# Patient Record
Sex: FEMALE | Race: WHITE | NOT HISPANIC OR LATINO | Employment: FULL TIME | URBAN - METROPOLITAN AREA
[De-identification: names, ages, dates, MRNs, and addresses within clinical notes are randomized per-mention and may not be internally consistent; named-entity substitution may affect disease eponyms.]

---

## 2017-06-18 ENCOUNTER — HOSPITAL ENCOUNTER (EMERGENCY)
Facility: HOSPITAL | Age: 32
Discharge: HOME | End: 2017-06-18
Attending: EMERGENCY MEDICINE | Admitting: EMERGENCY MEDICINE

## 2017-06-18 VITALS
HEART RATE: 93 BPM | HEIGHT: 64 IN | WEIGHT: 168 LBS | TEMPERATURE: 97.9 F | OXYGEN SATURATION: 96 % | DIASTOLIC BLOOD PRESSURE: 76 MMHG | SYSTOLIC BLOOD PRESSURE: 149 MMHG | RESPIRATION RATE: 20 BRPM | BODY MASS INDEX: 28.68 KG/M2

## 2017-06-18 DIAGNOSIS — S61.012A THUMB LACERATION, LEFT, INITIAL ENCOUNTER: Primary | ICD-10-CM

## 2017-06-18 PROCEDURE — 90715 TDAP VACCINE 7 YRS/> IM: CPT | Performed by: NURSE PRACTITIONER

## 2017-06-18 PROCEDURE — 90471 IMMUNIZATION ADMIN: CPT | Performed by: NURSE PRACTITIONER

## 2017-06-18 PROCEDURE — 25010000002 TDAP 5-2.5-18.5 LF-MCG/0.5 SUSPENSION: Performed by: NURSE PRACTITIONER

## 2017-06-18 PROCEDURE — 99283 EMERGENCY DEPT VISIT LOW MDM: CPT

## 2017-06-18 RX ADMIN — TETANUS TOXOID, REDUCED DIPHTHERIA TOXOID AND ACELLULAR PERTUSSIS VACCINE, ADSORBED 0.5 ML: 5; 2.5; 8; 8; 2.5 SUSPENSION INTRAMUSCULAR at 18:10

## 2021-03-20 ENCOUNTER — APPOINTMENT (OUTPATIENT)
Dept: GENERAL RADIOLOGY | Age: 36
End: 2021-03-20
Payer: COMMERCIAL

## 2021-03-20 ENCOUNTER — HOSPITAL ENCOUNTER (EMERGENCY)
Age: 36
Discharge: HOME | End: 2021-03-21
Payer: COMMERCIAL

## 2021-03-20 VITALS
WEIGHT: 148.9 LBS | BODY MASS INDEX: 25.42 KG/M2 | HEIGHT: 64 IN | OXYGEN SATURATION: 99 % | HEART RATE: 90 BPM | RESPIRATION RATE: 18 BRPM | SYSTOLIC BLOOD PRESSURE: 127 MMHG | DIASTOLIC BLOOD PRESSURE: 75 MMHG | TEMPERATURE: 98.2 F
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
## 2021-03-20 DIAGNOSIS — M25.561 ACUTE PAIN OF RIGHT KNEE: Primary | ICD-10-CM

## 2021-03-20 PROCEDURE — 73562 X-RAY EXAM OF KNEE 3: CPT

## 2021-03-20 PROCEDURE — 99282 EMERGENCY DEPT VISIT SF MDM: CPT

## 2021-03-20 RX ORDER — IBUPROFEN 800 MG/1
800 TABLET ORAL EVERY 8 HOURS PRN
Qty: 20 TABLET | Refills: 0 | Status: SHIPPED | OUTPATIENT
Start: 2021-03-20

## 2021-03-20 RX ORDER — LIDOCAINE 4 G/G
1 PATCH TOPICAL DAILY
Qty: 30 PATCH | Refills: 0 | Status: SHIPPED | OUTPATIENT
Start: 2021-03-20 | End: 2021-04-19

## 2021-03-20 RX ORDER — M-VIT,TX,IRON,MINS/CALC/FOLIC 27MG-0.4MG
1 TABLET ORAL DAILY
COMMUNITY

## 2021-03-20 ASSESSMENT — PAIN DESCRIPTION - PAIN TYPE: TYPE: ACUTE PAIN

## 2021-03-20 ASSESSMENT — PAIN DESCRIPTION - LOCATION: LOCATION: LEG

## 2021-03-20 ASSESSMENT — PAIN SCALES - GENERAL: PAINLEVEL_OUTOF10: 7

## 2021-03-21 ASSESSMENT — ENCOUNTER SYMPTOMS
VOMITING: 0
SHORTNESS OF BREATH: 0
NAUSEA: 0
DIARRHEA: 0
ABDOMINAL PAIN: 0
CHEST TIGHTNESS: 0

## 2021-03-21 NOTE — ED PROVIDER NOTES
[address] [facility]        Pt Name: Yumiko Akbar  MRN: [id number]  [place] 1985  Date of evaluation: 3/20/2021  Provider: VILMA Nails CNP  PCP: No primary care provider on file. AVIS. I have evaluated this patient. My supervising physician was available for consultation. CHIEF COMPLAINT       Chief Complaint   Patient presents with    Knee Pain     pt stepped off ladder, felt a pop, and had immediate pain       HISTORY OF PRESENT ILLNESS   (Location, Timing/Onset, Context/Setting, Quality, Duration, Modifying Factors, Severity, Associated Signs and Symptoms)  Note limiting factors. Yumiko Akbar is a 30 y.o. female presents to the emergency department complaining of right knee injury. The patient reports that the injury occurred several days ago while at work. She was stepping down off of a ladder as the latter was being supported by coworkers. States when she planted to the right foot to the ground that she immediately experienced pain and she felt/heard a popping sensation. She has pain with any weightbearing or purposeful movement, not much pain at rest.  She does rate her pain a 7 of 10, aching. Denies any headache, fever, lightheadedness, dizziness, visual disturbances. No chest pain or pressure. No neck or back pain. No shortness of breath, cough, or congestion. No abdominal pain, nausea, vomiting, diarrhea, constipation, or dysuria. No rash. Nursing Notes were all reviewed and agreed with or any disagreements were addressed in the HPI. REVIEW OF SYSTEMS    (2-9 systems for level 4, 10 or more for level 5)     Review of Systems   Constitutional: Negative for activity change, chills and fever. Respiratory: Negative for chest tightness and shortness of breath. Cardiovascular: Negative for chest pain. Gastrointestinal: Negative for abdominal pain, diarrhea, nausea and vomiting. Genitourinary: Negative for dysuria. Musculoskeletal:        Right knee pain   All other systems reviewed and are negative. Positives and Pertinent negatives as per HPI. Except as noted above in the ROS, all other systems were reviewed and negative. PAST MEDICAL HISTORY     Past Medical History:   Diagnosis Date    Asthma          SURGICAL HISTORY     Past Surgical History:   Procedure Laterality Date    CHOLECYSTECTOMY      WISDOM TOOTH EXTRACTION           CURRENTMEDICATIONS       Discharge Medication List as of 3/20/2021 11:52 PM      CONTINUE these medications which have NOT CHANGED    Details   Multiple Vitamins-Minerals (THERAPEUTIC MULTIVITAMIN-MINERALS) tablet Take 1 tablet by mouth dailyHistorical Med               ALLERGIES     Sulfa antibiotics and Tramadol    FAMILYHISTORY     No family history on file. SOCIAL HISTORY       Social History     Tobacco Use    Smoking status: Not on file   Substance Use Topics    Alcohol use: Not on file    Drug use: Not on file       SCREENINGS             PHYSICAL EXAM    (up to 7 for level 4, 8 or more for level 5)     ED Triage Vitals [03/20/21 2255]   BP Temp Temp Source Pulse Resp SpO2 Height Weight   127/75 98.2 °F (36.8 °C) Oral 90 18 99 % 5' 4\" (1.626 m) 148 lb 14.4 oz (67.5 kg)       Physical Exam  Vitals signs and nursing note reviewed. Constitutional:       Appearance: She is well-developed. She is not diaphoretic. HENT:      Head: Normocephalic and atraumatic. Right Ear: External ear normal.      Left Ear: External ear normal.   Eyes:      General:         Right eye: No discharge. Left eye: No discharge. Neck:      Musculoskeletal: Normal range of motion and neck supple. Vascular: No JVD. Cardiovascular:      Rate and Rhythm: Normal rate and regular rhythm. Pulses: Normal pulses. Heart sounds: Normal heart sounds. Pulmonary:      Effort: Pulmonary effort is normal. No respiratory distress. following medications:  Medications - No data to display        Briefly, this is a 30year old female that presents to the emergency department complaining of right knee injury. The patient reports that the injury occurred several days ago while at work. She was stepping down off of a ladder as the latter was being supported by coworkers. States when she planted to the right foot to the ground that she immediately experienced pain and she felt/heard a popping sensation. She has pain with any weightbearing or purposeful movement, not much pain at rest.  She does rate her pain a 7 of 10, aching. XR KNEE RIGHT (3 VIEWS) (Final result)  Result time 03/20/21 23:33:00  Final result by Madisyn Aleman MD (03/20/21 23:33:00)                Impression:    No acute fracture or malalignment. Patient is placed in a knee immobilizer, she is given NSAIDs. [place] of Work[place]'s [place] paperwork is completed. She is given a referral to orthopedics for [address] [place] [address] follow-up. I estimate there is LOW risk for FRACTURE, COMPARTMENT SYNDROME, DEEP VENOUS THROMBOSIS, SEPTIC ARTHRITIS, TENDON OR NEUROVASCULAR INJURY, thus I consider the discharge disposition reasonable. FINAL IMPRESSION      1. Acute pain of right knee    2.  Work related injury          DISPOSITION/PLAN   DISPOSITION Decision To Discharge 03/20/2021 11:41:10 PM      PATIENT REFERREDTO:  Zander Carranza MD  [address] [place] [address]  [phone]    Schedule an appointment as soon as possible for a visit         DISCHARGE MEDICATIONS:  Discharge Medication List as of 3/20/2021 11:52 PM      START taking these medications    Details   ibuprofen (ADVIL;MOTRIN) 800 MG tablet Take 1 tablet by mouth every 8 hours as needed for Pain or Fever, Disp-20 tablet, R-0Print      lidocaine 4 % external patch Place 1 patch onto the skin daily, Transdermal, DAILY Starting Sat 3/20/2021, Until Mon 4/19/2021, For 30 days, Disp-30 patch, R-0, Print             DISCONTINUED MEDICATIONS:  Discharge Medication List as of 3/20/2021 11:52 PM                 (Please note that portions of this note were completed with a voice recognition program.  Efforts were made to edit the dictations but occasionally words are mis-transcribed.)    VILMA Marie CNP (electronically signed)            VILMA Marie CNP  03/21/21 8218

## 2021-03-25 ENCOUNTER — OFFICE VISIT (OUTPATIENT)
Dept: ORTHOPEDIC SURGERY | Age: 36
End: 2021-03-25
Payer: COMMERCIAL

## 2021-03-25 VITALS
TEMPERATURE: 98.8 F | HEART RATE: 90 BPM | SYSTOLIC BLOOD PRESSURE: 117 MMHG | BODY MASS INDEX: 24.66 KG/M2 | DIASTOLIC BLOOD PRESSURE: 72 MMHG | WEIGHT: 148 LBS | HEIGHT: 65 IN

## 2021-03-25 DIAGNOSIS — S83.281A TEAR OF LATERAL MENISCUS OF RIGHT KNEE, CURRENT, UNSPECIFIED TEAR TYPE, INITIAL ENCOUNTER: Primary | ICD-10-CM

## 2021-03-25 PROCEDURE — 99203 OFFICE O/P NEW LOW 30 MIN: CPT | Performed by: PHYSICIAN ASSISTANT

## 2021-03-25 NOTE — LETTER
[facility]  [place] [address] [address] [address]  Phone: [phone]  Fax: [phone]    Juan John        March 25, 2021     Patient: Olena Saul   YOB: 1985   Date of Visit: 3/25/2021       To Whom It May Concern: It is my medical opinion that Olena Saul should remain out of work until after her mri. This will be estimated 1 week. If you have any questions or concerns, please don't hesitate to call.     Sincerely,          MARKO John

## 2021-03-25 NOTE — PROGRESS NOTES
tablet Take 1 tablet by mouth every 8 hours as needed for Pain or Fever 20 tablet 0    lidocaine 4 % external patch Place 1 patch onto the skin daily 30 patch 0     No current facility-administered medications for this visit. Objective:     She  is alert, oriented x 3, pleasant, well nourished, developed and in no acute distress. /72   Pulse 90   Temp 98.8 °F (37.1 °C)   Ht 5' 5\" (1.651 m)   Wt 148 lb (67.1 kg)   BMI 24.63 kg/m²      Examination of the right knee: Inspection of the skin no rashes or lesions. Inspection of the soft tissues no erythema. The overall alignment of the knee is neutral.  Gait is antalgic. There is a mild intra-articular effusion. AROM:           PROM:  Extension-         0                   0  Flexion -           100                   110  There moderate pain associated with ROM testing. Medial joint line non-tender to palpation. Lateral joint line is tender to palpation. Pes anserine bursa non- tender to palpation. Patellar tendon non- tender to palpation. Quadriceps tendon non- tender to palpation. Collateral ligaments non- tender to palpation. Popliteal fossa non-tender to palpation. Varus Stress testing negative for pain or crepitus. Valgus Stress testing negative for pain or crepitus. Lachman's test negative for  ACL laxity. Anterior Drawer test negative for ACL laxity. Posterior Drawer test negative for PCL laxity. Guerita's Test positive for meniscus tear. Patellar Compression testing negative for pain or crepitus. Extensor Mechanism is intact. Examination of the lower extremities are intact with sensation to light touch. Motor testing  5/5 in all major motor groups of the lower extremities. Gait is normal heel to toe. Gait is antalgic. SLR negative. Examination of the lower extremities shows intact perfusion to both lower extremities. No cyanosis.   Digits are warm to touch, capillary refill is less than 2 seconds. There is no edema noted. The skin to be intact without lacerations or abrasions. No significant erythema. No rashes or skin lesions. X Rays: not performed in the office today:   X Rays from [facility] or an outside facility:  Narrative   EXAMINATION:   THREE XRAY VIEWS OF THE RIGHT KNEE       3/20/2021 11:06 pm       COMPARISON:   None.       HISTORY:   ORDERING SYSTEM PROVIDED HISTORY: Right knee pain after injury   TECHNOLOGIST PROVIDED HISTORY:   Reason for exam:->injury       FINDINGS:   Bones: No acute fracture. No aggressive osseous lesion.       Joints: Joint spaces maintained. Normal alignment.       Soft tissues: No focal soft tissue swelling.           Impression   No acute fracture or malalignment.                     Assessment:       ICD-10-CM    1. Tear of lateral meniscus of right knee, current, unspecified tear type, initial encounter  S83.281A         Plan:       Assessment:  Acute right lateral knee pain. Tenderness with palpation over the lateral joint line. Positive Guerita's test.  She does not tolerate range of motion secondary to pain. There is a mild joint effusion. X-rays negative for any acute abnormality. Injury was a squatting and twisting type maneuver which is classic for meniscus tear. This injury is reported as happening at work. Plan:  Medications-continue anti-inflammatory medications, ibuprofen prescribed through the ER is adequate. She may call for refill. Tylenol for pain. NSAIDS discussed. She  was advised that NSAID-type medications have two very important potential side effects: gastrointestinal irritation including hemorrhage and renal injuries. She was asked to take the medication with food and to stop if she experiences any GI upset. I asked her to call for vomiting, abdominal pain or black/bloody stools. She should have renal function testing per his medical provider periodically.   The patient expresses understanding of these issues and questions were answered. PT- A home exercise program was instructed today including ROM exercises and strengthening exercises. The patient verbalized understanding of these exercises as well as the importance of the exercise program to promote return of normal function. If pain intensifies or other problems arise you are to notify the office. Further Imaging-MRI right knee to evaluate for suspected lateral meniscus tear. Procedures-none indicated at this time. Discussed knee arthroscopy and partial meniscectomy if MRI is positive for significant meniscus tear as I would expect. She was provided with a note to be off work until MRI and follow-up in office. Follow up-after MRI to review test results. Call or return to clinic if these symptoms worsen or fail to improve as anticipated.

## 2021-03-29 ENCOUNTER — HOSPITAL ENCOUNTER (OUTPATIENT)
Dept: MRI IMAGING | Age: 36
Discharge: HOME | End: 2021-03-29

## 2021-03-29 DIAGNOSIS — S83.281A TEAR OF LATERAL MENISCUS OF RIGHT KNEE, CURRENT, UNSPECIFIED TEAR TYPE, INITIAL ENCOUNTER: ICD-10-CM

## 2021-03-29 PROCEDURE — 73721 MRI JNT OF LWR EXTRE W/O DYE: CPT

## 2021-03-30 ENCOUNTER — OFFICE VISIT (OUTPATIENT)
Dept: ORTHOPEDIC SURGERY | Age: 36
End: 2021-03-30
Payer: COMMERCIAL

## 2021-03-30 ENCOUNTER — TELEPHONE (OUTPATIENT)
Dept: ORTHOPEDIC SURGERY | Age: 36
End: 2021-03-30

## 2021-03-30 VITALS
HEART RATE: 91 BPM | TEMPERATURE: 99.5 F | BODY MASS INDEX: 25.61 KG/M2 | WEIGHT: 150 LBS | HEIGHT: 64 IN | DIASTOLIC BLOOD PRESSURE: 63 MMHG | SYSTOLIC BLOOD PRESSURE: 107 MMHG

## 2021-03-30 DIAGNOSIS — S86.911A KNEE STRAIN, RIGHT, INITIAL ENCOUNTER: Primary | ICD-10-CM

## 2021-03-30 DIAGNOSIS — M22.41 CHONDROMALACIA PATELLAE OF RIGHT KNEE: ICD-10-CM

## 2021-03-30 PROCEDURE — 99213 OFFICE O/P EST LOW 20 MIN: CPT | Performed by: PHYSICIAN ASSISTANT

## 2021-03-30 NOTE — LETTER
[facility]  [address] [address]  Phone: [phone]  Fax: [phone]    Juan Trevino        March 30, 2021     Patient: Chasidy Johnson   YOB: 1985   Date of Visit: 3/30/2021       To Whom It May Concern: It is my medical opinion that Chasidy Johnson may return to work on 3/31/2021 light duty: no squatting, no stairs, no twisting for 6 weeks. If you have any questions or concerns, please don't hesitate to call.     Sincerely,          MARKO Trevino

## 2021-03-31 PROBLEM — M22.42 CHONDROMALACIA PATELLAE OF LEFT KNEE: Status: ACTIVE | Noted: 2021-03-31

## 2021-03-31 PROBLEM — S86.912A KNEE STRAIN, LEFT, INITIAL ENCOUNTER: Status: ACTIVE | Noted: 2021-03-31

## 2021-03-31 NOTE — PROGRESS NOTES
neutral.  Gait is remains antalgic. There is minimal intra-articular effusion. AROM:           PROM:  Extension-         0                   0  Flexion -           100                   110  There complaints of moderate pain associated with ROM testing. Medial joint line mildly tender to palpation. Lateral joint line moderately tender to palpation. Pes anserine bursa nontender tender to palpation. Patellar tendon nontender tender to palpation. Quadriceps tendon nontender tender to palpation. Collateral ligaments nontender tender to palpation. Popliteal fossa nontender tender to palpation. Varus Stress testing negative for pain or crepitus. Valgus Stress testing negative for pain or crepitus. Lachman's test negative for  ACL laxity. Anterior Drawer test negative for ACL laxity. Posterior Drawer test negative for PCL laxity. Guerita's Test reports pain for meniscus tear. Patellar Compression testing positive for pain or crepitus. Extensor Mechanism is intact. MRI: Obtained from [facility] or an outside facility.   Narrative   EXAMINATION:   MRI OF THE RIGHT KNEE WITHOUT CONTRAST, 3/29/2021 8:10 am       TECHNIQUE:   Multiplanar multisequence MRI of the right knee was performed without the   administration of intravenous contrast.       COMPARISON:   None.       HISTORY:   ORDERING SYSTEM PROVIDED HISTORY: Tear of lateral meniscus of right knee,   current, unspecified tear type, initial encounter   TECHNOLOGIST PROVIDED HISTORY:   Reason for exam:->[facility]:  DOI 3.20.2021   Is the patient pregnant?->No   Reason for Exam: Franklin Frankel is a 30 y.o. female presents to the emergency   department complaining of right knee injury.  The patient reports that the   injury occurred several days ago while at work. Jun [place] was stepping down off of   a ladder as the latter was being supported by coworkers. Den Narayanan when she   planted to the right foot to the ground that she immediately experienced pain   and she felt/heard a popping sensation.  She has pain with any weightbearing   or purposeful movement, not much pain at rest.  She does rate her pain a 7 of   10, aching. Acuity: Acute   Type of Exam: Initial       FINDINGS:   MENISCI: The medial and lateral menisci are normal in bulk, contour, and   signal intensity, without discrete tear identified.       CRUCIATE LIGAMENTS: The anterior and posterior cruciate ligaments are normal   in signal, morphology and course.       EXTENSOR MECHANISM: Quadriceps and patellar tendons are intact. Medial/lateral patellofemoral retinacula are intact.       LATERAL COLLATERAL LIGAMENT COMPLEX: The popliteus tendon, biceps femoris   tendon, fibular collateral ligament and iliotibial band are intact.       MEDIAL COLLATERAL LIGAMENT COMPLEX: The superficial and deep components of   the medial collateral ligament are intact.       KNEE JOINT: Articular cartilage is preserved within the medial and lateral   compartments.  Mild chondral fissuring within the patellar apex/lateral   patellar facet.  TT TG interval measures 8 mm.  Articular cartilage is   essentially preserved within the medial, lateral, and patellofemoral   compartments.       BONE MARROW: No discrete marrow signal abnormality.           Impression   No meniscal, cruciate, or collateral ligamentous tear.  No joint effusion. Mild chondral fissuring within the patellar apex/lateral patellar facet.                 X Rays: not performed in the office today:       Diagnosis:       ICD-10-CM    1. Knee strain, right, initial encounter  S86.911A    2. Chondromalacia patellae of right knee  M22.41         Assessment and Plan:       Assessment:  Right knee strain, [facility] injury. MRI negative for definite lateral meniscus tear. She does have mild chondral fissuring within the patellar apex and lateral patellar facet which may be a direct result of her squatting down to get into the work safe.   No evidence of a retinacular tear to suggest a patellar subluxation event. Today, there is no joint effusion. Collateral ligaments and cruciate ligaments are intact without evidence of strain. At least 24 minutes was the total time spent on today's visit  including reviewing test results, obtaining or reviewing history, physical exam, counseling and educating, time spent on documentation in health record, ordering prescriptions, tests or procedures after the visit. Plan:  Medications-NSAIDs either Advil or Aleve recommended. I do believe a intra-articular steroid injection would provide moderate benefit of her right knee pain. I am going to request this through [facility]. PT-I do believe physical therapy would be beneficial to work on range of motion, quad strengthening and VMO strengthening for chondral changes of the patellofemoral compartment. Further Imaging-not indicated at this time. Procedures-injection not provided today, awaiting [facility] approval.    Restrictions discussed. She may return to work light duty tomorrow with limitations x6 weeks. She needs avoid squatting, twisting and ladder climbing. Follow up-once [facility] has authorized intra-articular steroid injection. Call or return to clinic if these symptoms worsen or fail to improve as anticipated.

## 2021-04-05 ENCOUNTER — TELEPHONE (OUTPATIENT)
Dept: ORTHOPEDIC SURGERY | Age: 36
End: 2021-04-05

## 2021-04-05 PROBLEM — S86.911A KNEE STRAIN, RIGHT, INITIAL ENCOUNTER: Status: ACTIVE | Noted: 2021-03-31

## 2021-04-05 PROBLEM — M22.41 CHONDROMALACIA PATELLAE OF RIGHT KNEE: Status: ACTIVE | Noted: 2021-04-05

## 2021-04-05 NOTE — TELEPHONE ENCOUNTER
Received a call from Uri Diallo in regards to [facility] claim. Wanting to know if we can amend for a Right knee sprain? Also the office note from 3/31/2021 needs to be fixed  Because at the end of the note it states its her Left knee and it is her Right Knee. I had to withdraw my C9's for PT and the injection because the only DX on claim is for an LMT but it was not on the MRI. So they would not accept it.

## 2021-04-22 ENCOUNTER — TELEPHONE (OUTPATIENT)
Dept: ORTHOPEDIC SURGERY | Age: 36
End: 2021-04-22

## 2021-04-23 NOTE — TELEPHONE ENCOUNTER
Please send me an Amend request through in-basket messages with DX and body part. This was overlooked as it was not sent through in-basket request as per our Work Flow.

## 2021-04-26 ENCOUNTER — OFFICE VISIT (OUTPATIENT)
Dept: ORTHOPEDIC SURGERY | Age: 36
End: 2021-04-26
Payer: COMMERCIAL

## 2021-04-26 VITALS — WEIGHT: 150 LBS | HEIGHT: 64 IN | BODY MASS INDEX: 25.61 KG/M2

## 2021-04-26 DIAGNOSIS — S86.911A KNEE STRAIN, RIGHT, INITIAL ENCOUNTER: Primary | ICD-10-CM

## 2021-04-26 PROCEDURE — 99212 OFFICE O/P EST SF 10 MIN: CPT | Performed by: PHYSICIAN ASSISTANT

## 2021-04-26 NOTE — PROGRESS NOTES
Subjective:      Patient ID: Naomi Vlae is a 30 y.o. female. Chief Complaint   Patient presents with    Follow-up     right knee        HPI:   She is here for follow up on her right knee strain- [facility]. DOI- 3/20/92604. She states her right knee is doing well. She would like to return to work regular duty. She is not having any issues at this time other than mild discomfort with heavy lifting. She has been working but on light duty restrictions. Review Of Systems:   Negative for fever or chills. Past Medical History:   Diagnosis Date    Asthma        No family history on file. Past Surgical History:   Procedure Laterality Date    CHOLECYSTECTOMY      WISDOM TOOTH EXTRACTION         Social History     Occupational History    Not on file   Tobacco Use    Smoking status: Current Every Day Smoker    Smokeless tobacco: Never Used   Substance and Sexual Activity    Alcohol use: Not on file    Drug use: Not on file    Sexual activity: Not on file       Current Outpatient Medications   Medication Sig Dispense Refill    Multiple Vitamins-Minerals (THERAPEUTIC MULTIVITAMIN-MINERALS) tablet Take 1 tablet by mouth daily      ibuprofen (ADVIL;MOTRIN) 800 MG tablet Take 1 tablet by mouth every 8 hours as needed for Pain or Fever 20 tablet 0     No current facility-administered medications for this visit. Objective:     She is alert, oriented x 3, pleasant, well nourished, developed and in no   acute distress. Ht 5' 4\" (1.626 m)   Wt 150 lb (68 kg)   BMI 25.75 kg/m²      Examination of the right knee: Inspection of the skin no abrasions or skin lesions. Inspection of the soft tissues no soft tissue swelling, no erythema. The overall alignment of the knee is neutral.  Gait is normal.  There is no intra-articular effusion.                          AROM:           PROM:  Extension-         0                   0  Flexion -           135                   140  There no pain

## 2021-04-26 NOTE — LETTER
[facility]  [address] [address]  Phone: [phone]  Fax: [place], [place]        April 26, 2021     Patient: Genie Estrada   YOB: 1985   Date of Visit: 4/26/2021       To Whom It May Concern: It is my medical opinion that Genie Estrada may return to full duty immediately with no restrictions. If you have any questions or concerns, please don't hesitate to call.     Sincerely,          MARKO Fortune

## 2022-10-19 ENCOUNTER — HOSPITAL ENCOUNTER (EMERGENCY)
Age: 37
Discharge: HOME | End: 2022-10-19

## 2022-10-19 ENCOUNTER — APPOINTMENT (OUTPATIENT)
Dept: GENERAL RADIOLOGY | Age: 37
End: 2022-10-19

## 2022-10-19 VITALS
RESPIRATION RATE: 18 BRPM | WEIGHT: 177.69 LBS | HEART RATE: 73 BPM | OXYGEN SATURATION: 96 % | HEIGHT: 64 IN | DIASTOLIC BLOOD PRESSURE: 83 MMHG | TEMPERATURE: 98 F | BODY MASS INDEX: 30.34 KG/M2 | SYSTOLIC BLOOD PRESSURE: 117 MMHG

## 2022-10-19 DIAGNOSIS — S83.91XA SPRAIN OF RIGHT KNEE, UNSPECIFIED LIGAMENT, INITIAL ENCOUNTER: Primary | ICD-10-CM

## 2022-10-19 DIAGNOSIS — A08.4 VIRAL GASTROENTERITIS: ICD-10-CM

## 2022-10-19 LAB
CHP ED QC CHECK: YES
GLUCOSE BLD-MCNC: 102 MG/DL
GLUCOSE BLD-MCNC: 102 MG/DL (ref 70–99)
PERFORMED ON: ABNORMAL

## 2022-10-19 PROCEDURE — 6370000000 HC RX 637 (ALT 250 FOR IP): Performed by: GENERAL ACUTE CARE HOSPITAL

## 2022-10-19 PROCEDURE — 99283 EMERGENCY DEPT VISIT LOW MDM: CPT

## 2022-10-19 PROCEDURE — 73562 X-RAY EXAM OF KNEE 3: CPT

## 2022-10-19 RX ORDER — ONDANSETRON 4 MG/1
4 TABLET, ORALLY DISINTEGRATING ORAL EVERY 8 HOURS PRN
Qty: 20 TABLET | Refills: 0 | Status: SHIPPED | OUTPATIENT
Start: 2022-10-19

## 2022-10-19 RX ORDER — PREDNISONE 20 MG/1
60 TABLET ORAL ONCE
Status: COMPLETED | OUTPATIENT
Start: 2022-10-19 | End: 2022-10-19

## 2022-10-19 RX ORDER — ONDANSETRON 4 MG/1
4 TABLET, ORALLY DISINTEGRATING ORAL ONCE
Status: COMPLETED | OUTPATIENT
Start: 2022-10-19 | End: 2022-10-19

## 2022-10-19 RX ORDER — PREDNISONE 50 MG/1
50 TABLET ORAL DAILY
Qty: 5 TABLET | Refills: 0 | Status: SHIPPED | OUTPATIENT
Start: 2022-10-19 | End: 2022-10-24

## 2022-10-19 RX ADMIN — ONDANSETRON 4 MG: 4 TABLET, ORALLY DISINTEGRATING ORAL at 18:45

## 2022-10-19 RX ADMIN — PREDNISONE 60 MG: 20 TABLET ORAL at 19:10

## 2022-10-19 ASSESSMENT — ENCOUNTER SYMPTOMS
COUGH: 0
VOMITING: 0
BACK PAIN: 0
DIARRHEA: 1
SORE THROAT: 0
ABDOMINAL PAIN: 0
NAUSEA: 1
CHEST TIGHTNESS: 0
WHEEZING: 0
SHORTNESS OF BREATH: 0
VOICE CHANGE: 0
BLOOD IN STOOL: 0

## 2022-10-19 ASSESSMENT — PAIN DESCRIPTION - LOCATION: LOCATION: KNEE

## 2022-10-19 ASSESSMENT — PAIN - FUNCTIONAL ASSESSMENT: PAIN_FUNCTIONAL_ASSESSMENT: 0-10

## 2022-10-19 ASSESSMENT — PAIN DESCRIPTION - ORIENTATION: ORIENTATION: RIGHT

## 2022-10-19 ASSESSMENT — PAIN SCALES - GENERAL: PAINLEVEL_OUTOF10: 8

## 2022-10-19 NOTE — ED NOTES
Pt refusing ACE wrap and crutches. States she has a knee brace and crutches in the car.      Anoop Lantigua RN  10/19/22 [id number]

## 2022-10-19 NOTE — DISCHARGE INSTRUCTIONS
Take prednisone as directed to help with your knee discomfort. Take the prescribed Zofran as directed for nausea. Increase your fluid intake and consume bland diet for the next several days. Follow-up as directed. Return for worsening symptoms.

## 2022-10-19 NOTE — ED PROVIDER NOTES
[facility] ENCOUNTER        Pt Name: Riaz Barber  MRN: [id number]  [place] 1985  Date of evaluation: 10/19/2022  Provider: VILMA Cardozo CNP  PCP: No primary care provider on file. Note Started: 6:54 PM EDT       AVIS. I have evaluated this patient. My supervising physician was available for consultation. CHIEF COMPLAINT       Chief Complaint   Patient presents with    Knee Injury     Pt slipped in shower and twisted right knee. States already has issues with right knee. C/o pain 8/10. HISTORY OF PRESENT ILLNESS   (Location, Timing/Onset, Context/Setting, Quality, Duration, Modifying Factors, Severity, Associated Signs and Symptoms)  Note limiting factors. Chief Complaint: Right knee pain    Riaz Barber is a 29 y.o. female who presents to the emergency department today reporting right knee pain. Patient states that she has history of chronic knee problems secondary to work-related injury which occurred more than a year ago. Patient states that she slipped in the shower and twisted the right knee prior to arrival.  Since then she has had increased pain to the affected area. She currently reports a pain level of 8 out of 10. She describes this pain as constant, dull, and throbbing. The pain is nonmigratory. Pain is worse with movement and with attempted ambulation. She has not taken anything for her symptoms. Patient states that she has had nausea and diarrhea for the last couple of days. There is no history of any inflammatory bowel disease. She denies recent travel or known sick contacts. She denies hematochezia or melena. There has been no fever, chills, or other symptoms. Nursing Notes were all reviewed and agreed with or any disagreements were addressed in the HPI.     REVIEW OF SYSTEMS    (2-9 systems for level 4, 10 or more for level 5)     Review of Systems   Constitutional:  Negative for chills, fatigue and fever. HENT:  Negative for congestion, sore throat and voice change. Eyes:  Negative for visual disturbance. Respiratory:  Negative for cough, chest tightness, shortness of breath and wheezing. Cardiovascular:  Negative for chest pain and palpitations. Gastrointestinal:  Positive for diarrhea and nausea. Negative for abdominal pain, blood in stool and vomiting. Endocrine: Negative for polydipsia and polyuria. Genitourinary:  Negative for difficulty urinating, dysuria, flank pain and genital sores. Musculoskeletal:  Positive for arthralgias and gait problem. Negative for back pain, myalgias, neck pain and neck stiffness. Skin:  Negative for rash and wound. Allergic/Immunologic: Negative for immunocompromised state. Neurological:  Negative for dizziness, weakness and light-headedness. Hematological:  Does not bruise/bleed easily. Psychiatric/Behavioral:  Negative for sleep disturbance and suicidal ideas. Positives and Pertinent negatives as per HPI. Except as noted above in the ROS, all other systems were reviewed and negative. PAST MEDICAL HISTORY     Past Medical History:   Diagnosis Date    Asthma          SURGICAL HISTORY     Past Surgical History:   Procedure Laterality Date    CHOLECYSTECTOMY      WISDOM TOOTH EXTRACTION           CURRENTMEDICATIONS       Discharge Medication List as of 10/19/2022  7:12 PM        CONTINUE these medications which have NOT CHANGED    Details   Multiple Vitamins-Minerals (THERAPEUTIC MULTIVITAMIN-MINERALS) tablet Take 1 tablet by mouth dailyHistorical Med      ibuprofen (ADVIL;MOTRIN) 800 MG tablet Take 1 tablet by mouth every 8 hours as needed for Pain or Fever, Disp-20 tablet, R-0Print               ALLERGIES     Sulfa antibiotics and Tramadol    FAMILYHISTORY     History reviewed. No pertinent family history.        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 Constitutional:       Appearance: Normal appearance. HENT:      Head: Normocephalic and atraumatic. Right Ear: External ear normal.      Left Ear: External ear normal.      Nose: Nose normal.      Mouth/Throat:      Pharynx: Oropharynx is clear. Eyes:      General:         Right eye: No discharge. Left eye: No discharge. Extraocular Movements: Extraocular movements intact. Cardiovascular:      Rate and Rhythm: Normal rate and regular rhythm. Pulses: Normal pulses. Heart sounds: Normal heart sounds. Pulmonary:      Effort: Pulmonary effort is normal. No respiratory distress. Breath sounds: Normal breath sounds. Abdominal:      General: Bowel sounds are normal.      Palpations: Abdomen is soft. Tenderness: There is no abdominal tenderness. There is no right CVA tenderness or left CVA tenderness. Musculoskeletal:      Cervical back: Normal range of motion and neck supple. Right knee: No swelling, deformity, ecchymosis or lacerations. Decreased range of motion. Tenderness present over the lateral joint line. No medial joint line tenderness. Normal alignment, normal meniscus and normal patellar mobility. Normal pulse. Right lower leg: No edema. Left lower leg: No edema. Skin:     General: Skin is warm and dry.       Capillary Refill: Capillary refill takes less than 2 seconds. Neurological:      General: No focal deficit present. Mental Status: She is alert and oriented to person, place, and time. Psychiatric:         Mood and Affect: Mood normal.         Behavior: Behavior normal.         Thought Content: Thought content normal.         Judgment: Judgment normal.       DIAGNOSTIC RESULTS   LABS:    Labs Reviewed   POCT GLUCOSE - Abnormal; Notable for the following components:       Result Value    POC Glucose 102 (*)     All other components within normal limits   POCT GLUCOSE - Normal       When ordered only abnormal lab results are displayed. All other labs were within normal range or not returned as of this dictation. EKG: When ordered, EKG's are interpreted by the Emergency Department Physician in the absence of a cardiologist.  Please see their note for interpretation of EKG. RADIOLOGY:   Non-plain film images such as CT, Ultrasound and MRI are read by the radiologist. Plain radiographic images are visualized and preliminarily interpreted by the ED Provider with the below findings:        Interpretation per the Radiologist below, if available at the time of this note:    XR KNEE RIGHT (3 VIEWS)   Final Result   No acute fracture or dislocation           XR KNEE RIGHT (3 VIEWS)    Result Date: 10/19/2022  EXAMINATION: THREE XRAY VIEWS OF THE RIGHT KNEE 10/19/2022 6:24 pm COMPARISON: None. HISTORY: ORDERING SYSTEM PROVIDED HISTORY: fall/injury TECHNOLOGIST PROVIDED HISTORY: Reason for exam:->fall/injury Reason for Exam: pt fell right knee pain FINDINGS: No acute fracture or dislocation. No joint effusion.      No acute fracture or dislocation           PROCEDURES   Unless otherwise noted below, none     Procedures    CRITICAL CARE TIME   none    CONSULTS:  None      EMERGENCY DEPARTMENT COURSE and DIFFERENTIAL DIAGNOSIS/MDM:   Vitals:    Vitals:    10/19/22 1817   BP: 117/83   Pulse: 73   Resp: 18   Temp: 98 °F (36.7 °C) TempSrc: Oral   SpO2: 96%   Weight: 177 lb 11.1 oz (80.6 kg)   Height: 5' 4\" (1.626 m)       Patient was given the following medications:  Medications   ondansetron (ZOFRAN-ODT) disintegrating tablet 4 mg (4 mg Oral Given 10/19/22 1845)   predniSONE (DELTASONE) tablet 60 mg (60 mg Oral Given 10/19/22 1910)         Is this patient to be included in the SEP-1 Core Measure due to severe sepsis or septic shock? No   Exclusion criteria - the patient is NOT to be included for SEP-1 Core Measure due to: Infection is not suspected    Previous records reviewed in order to gain further information regarding patient's PMH as well as her HPI. Nursing notes reviewed. This is a 55-year-old female who presents to the emergency department today reporting a right knee injury. Patient reports slipping in the shower prior to arrival.  Patient reports 2 to 3-day history of nausea and diarrhea as well. Physical exam complete. Patient arrives nontoxic, afebrile, normotensive. No signs or symptoms of acute distress noted. Patient medicated with Zofran ODT. She is also medicated with prednisone to help with her knee discomfort. Right knee x-ray interpreted by radiologist and reviewed by myself is negative for acute findings. Patient reassessed. She has remained stable throughout ED visit and does report improvement of symptoms after intervention. At this time there is no evidence of any life-threatening or emergent conditions requiring immediate intervention. Low suspicion for appendicitis, cholecystitis, or diverticulitis. There is no evidence of compartment syndrome or septic joint. Patient discharged with emphasis on close outpatient follow-up. She is encouraged to increase her fluid intake and to consume bland diet for the next several days. She agrees to take the prescribed Zofran and prednisone as directed. She agrees to use rice technique.   She agrees to follow-up with her PCP as well as with her orthopedic

## 2022-10-31 ENCOUNTER — OFFICE VISIT (OUTPATIENT)
Dept: ORTHOPEDIC SURGERY | Age: 37
End: 2022-10-31

## 2022-10-31 VITALS — BODY MASS INDEX: 31.92 KG/M2 | HEIGHT: 64 IN | WEIGHT: 187 LBS

## 2022-10-31 DIAGNOSIS — M76.31 ILIOTIBIAL BAND SYNDROME OF RIGHT SIDE: Primary | ICD-10-CM

## 2022-10-31 PROCEDURE — 99213 OFFICE O/P EST LOW 20 MIN: CPT | Performed by: ORTHOPAEDIC SURGERY

## 2022-10-31 PROCEDURE — 20610 DRAIN/INJ JOINT/BURSA W/O US: CPT | Performed by: ORTHOPAEDIC SURGERY

## 2022-10-31 RX ORDER — BUPIVACAINE HYDROCHLORIDE 2.5 MG/ML
2 INJECTION, SOLUTION INFILTRATION; PERINEURAL ONCE
Status: COMPLETED | OUTPATIENT
Start: 2022-10-31 | End: 2022-10-31

## 2022-10-31 RX ORDER — TRIAMCINOLONE ACETONIDE 40 MG/ML
40 INJECTION, SUSPENSION INTRA-ARTICULAR; INTRAMUSCULAR ONCE
Status: COMPLETED | OUTPATIENT
Start: 2022-10-31 | End: 2022-10-31

## 2022-10-31 RX ADMIN — BUPIVACAINE HYDROCHLORIDE 5 MG: 2.5 INJECTION, SOLUTION INFILTRATION; PERINEURAL at 15:25

## 2022-10-31 RX ADMIN — TRIAMCINOLONE ACETONIDE 40 MG: 40 INJECTION, SUSPENSION INTRA-ARTICULAR; INTRAMUSCULAR at 15:25

## 2022-11-01 NOTE — PROGRESS NOTES
[facility]    Chief Complaint: Right knee pain    HPI:  Lizbeth Kramer is a 29 y.o. who is here for assessment of right knee pain. She has seen Adolfo Garcia in the past for this issue. She slipped in the shower and twisted her knee 12 days ago. She was seen in the ER for this injury at that time. She reports lateral knee pain that is present on a daily basis. She denies right hip pain. She describes her knee as feeling tight and then will pop which will relieve pressure. This will also cause pain on the lateral side of the knee. Pain is worse on steps. She has been using a knee brace and taking ibuprofen to help with the pain. She walks without assistive device. She had a work-related injury to this knee about 1.5 years ago at which time she had an MRI done that was essentially normal.      Patient Active Problem List   Diagnosis    Chondromalacia patellae of left knee    Knee strain, right, initial encounter    Chondromalacia patellae of right knee       ROS:  Constitutional: denies fever, chills, weight loss  MSK: denies pain in other joints, muscle aches  Neurological: denies numbness, tingling, weakness    Exam:  Height 5' 4\" (1.626 m), weight 187 lb (84.8 kg). Appearance: sitting in exam room chair, appears to be in no acute distress, awake and alert  Resp: unlabored breathing on room air  Skin: warm, dry and intact with out erythema or significant increased temperature  Neuro: grossly intact both lower extremities. Intact sensation to light touch. Motor exam 4+ to 5/5 in all major motor groups. Right knee: Examination demonstrates no gross deformity. Skin is unremarkable. Range of motion 0 to 130 degrees. The knee is stable to varus and valgus stress and stable to anterior and posterior drawer sign. Tender along IT band over the lateral femoral condyle and hamstring tendon insertion to the fibular head. Sensation is intact light touch.   There is brisk capillary refill. There is 5/5 muscle strength in all muscle groups. Imaging:  Recent right knee radiographs were reviewed and are significant for maintained joint spaces with no fractures or dislocations. Assessment:  Right knee IT band syndrome    Plan:  We discussed the diagnosis and treatment options. I recommended continued use of ibuprofen to help with the pain. I also recommended going to physical therapy for 1-2 sessions for teaching of a home exercise program for IT band stretching. Finally, we discussed a steroid injection in the area of pain over the lateral femoral condyle. This was performed today as described below. She will use NSAIDs and do these exercises for least 4 weeks and then return to the office if her symptoms persist at that time. PROCEDURE NOTE:  After verbal consent was obtained, the patient's right knee was prepped with alcohol. Skin was anesthetized with ethyl chloride. The IT band at the lateral femoral condyle was then injected under sterile technique with 2mL of 0.25% marcaine and 1 mL of 40 mg/mL Kenalog. A bandage was applied. The patient tolerated the procedure well and there were no complications. Total time spent on today's encounter was at least 24 minutes. This time included reviewing prior notes, radiographs, and lab results when available, reviewing history obtained by medical assistant, performing history and physical exam, reviewing tests/radiographs with the patient, counseling the patient, ordering medications or tests, documentation in the electronic health record, and coordination of care. This dictation was done with Dragon dictation and may contain mechanical errors related to translation.

## 2023-02-02 ENCOUNTER — OFFICE VISIT (OUTPATIENT)
Dept: ORTHOPEDIC SURGERY | Age: 38
End: 2023-02-02

## 2023-02-02 VITALS — HEIGHT: 64 IN | WEIGHT: 182 LBS | BODY MASS INDEX: 31.07 KG/M2

## 2023-02-02 DIAGNOSIS — M76.31 ILIOTIBIAL BAND SYNDROME OF RIGHT SIDE: Primary | ICD-10-CM

## 2023-02-04 NOTE — PROGRESS NOTES
This dictation was done with [employer] dictation and may contain mechanical errors related to translation. Height 5' 4\" (1.626 m), weight 182 lb (82.6 kg). This is a very pleasant 44-year-old female who had some lateral knee pain last year and was seen and diagnosed with iliotibial friction band syndrome. She had an injection on 10/31/2022 we discussed great relief. She actually had no pain for a long time but recently has returned and she is here requesting another injection. On examination today this a very pleasant 44-year-old female who is alert and orient x3 she is got good full motion by both her knee and her hip on the right side. Neurologically she intact her foot. She does have palpable tenderness over the insertion site of the iliotibial band laterally on the right knee. My pression is right knee ITB friction band syndrome. With her consent she was injected with 1 cc Kenalog and 2 cc of Marcaine into the insertion site area of the iliotibial band of the right knee. She tolerated well we talked about stretching of her piriformis all the way down the lateral aspect of her right leg.   We will see her in follow-up in as-needed basis

## 2024-02-26 ENCOUNTER — OFFICE VISIT (OUTPATIENT)
Dept: ORTHOPEDIC SURGERY | Age: 39
End: 2024-02-26
Payer: COMMERCIAL

## 2024-02-26 VITALS — BODY MASS INDEX: 31.07 KG/M2 | HEIGHT: 64 IN | RESPIRATION RATE: 16 BRPM | WEIGHT: 182 LBS

## 2024-02-26 DIAGNOSIS — M75.81 TENDINITIS OF RIGHT ROTATOR CUFF: ICD-10-CM

## 2024-02-26 DIAGNOSIS — M25.511 RIGHT SHOULDER PAIN, UNSPECIFIED CHRONICITY: Primary | ICD-10-CM

## 2024-02-26 PROCEDURE — 20610 DRAIN/INJ JOINT/BURSA W/O US: CPT | Performed by: PHYSICIAN ASSISTANT

## 2024-02-26 PROCEDURE — 99213 OFFICE O/P EST LOW 20 MIN: CPT | Performed by: PHYSICIAN ASSISTANT

## 2024-02-26 RX ORDER — BUPIVACAINE HYDROCHLORIDE 2.5 MG/ML
2 INJECTION, SOLUTION INFILTRATION; PERINEURAL ONCE
Status: COMPLETED | OUTPATIENT
Start: 2024-02-26 | End: 2024-02-26

## 2024-02-26 RX ORDER — TRIAMCINOLONE ACETONIDE 40 MG/ML
40 INJECTION, SUSPENSION INTRA-ARTICULAR; INTRAMUSCULAR ONCE
Status: COMPLETED | OUTPATIENT
Start: 2024-02-26 | End: 2024-02-26

## 2024-02-26 RX ADMIN — BUPIVACAINE HYDROCHLORIDE 5 MG: 2.5 INJECTION, SOLUTION INFILTRATION; PERINEURAL at 15:20

## 2024-02-26 RX ADMIN — TRIAMCINOLONE ACETONIDE 40 MG: 40 INJECTION, SUSPENSION INTRA-ARTICULAR; INTRAMUSCULAR at 15:21

## 2024-02-28 NOTE — PROGRESS NOTES
This dictation was done with [employer] dictation and may contain mechanical errors related to translation.    I have today reviewed with Linda Nayak the clinically relevant, past medical history, medications, allergies, family history, social history, and Review Of Systems form the patient’s most recent history form & I have documented any details relevant to today's presenting complaints in my history below. Ms. Linda Nayak's self-reported past medical history, medications, allergies, family history, social history, and Review Of Systems form has been scanned into the chart under the \"Media\" tab.    Subjective:  Linda Nayak is a 38 y.o. who is here as an established patient complaining of new problem in her right shoulder over the last 2 to 3 weeks she has had some increasing pain with overhead activities.  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  She has tried ibuprofen and icing it continues to bother her so she comes here for evaluation and treatment we have seen her before for her knees.  She was sent for an AP transaxillary view and Y view x-ray of her right shoulder      Patient Active Problem List   Diagnosis    Chondromalacia patellae of left knee    Knee strain, right, initial encounter    Chondromalacia patellae of right knee           Current Outpatient Medications on File Prior to Visit   Medication Sig Dispense Refill    tiZANidine (ZANAFLEX) 4 MG tablet Take 1 tablet by mouth 4 times daily as needed (muscle spasms) 40 tablet 0    ondansetron (ZOFRAN ODT) 4 MG disintegrating tablet Take 1 tablet by mouth every 8 hours as needed for Nausea 20 tablet 0    Multiple Vitamins-Minerals (THERAPEUTIC MULTIVITAMIN-MINERALS) tablet Take 1 tablet by mouth daily      ibuprofen (ADVIL;MOTRIN) 800 MG tablet Take 1 tablet by mouth every 8 hours as needed for Pain